# Patient Record
Sex: FEMALE | Race: WHITE | NOT HISPANIC OR LATINO | URBAN - METROPOLITAN AREA
[De-identification: names, ages, dates, MRNs, and addresses within clinical notes are randomized per-mention and may not be internally consistent; named-entity substitution may affect disease eponyms.]

---

## 2018-01-01 ENCOUNTER — INPATIENT (INPATIENT)
Facility: HOSPITAL | Age: 83
LOS: 0 days | DRG: 951 | End: 2018-10-29
Attending: INTERNAL MEDICINE | Admitting: INTERNAL MEDICINE
Payer: MEDICARE

## 2018-01-01 VITALS
OXYGEN SATURATION: 99 % | RESPIRATION RATE: 30 BRPM | DIASTOLIC BLOOD PRESSURE: 60 MMHG | SYSTOLIC BLOOD PRESSURE: 164 MMHG | HEART RATE: 72 BPM

## 2018-01-01 VITALS
DIASTOLIC BLOOD PRESSURE: 81 MMHG | HEART RATE: 67 BPM | OXYGEN SATURATION: 100 % | TEMPERATURE: 98 F | RESPIRATION RATE: 34 BRPM | WEIGHT: 104.94 LBS | SYSTOLIC BLOOD PRESSURE: 212 MMHG | HEIGHT: 60 IN

## 2018-01-01 DIAGNOSIS — I62.9 NONTRAUMATIC INTRACRANIAL HEMORRHAGE, UNSPECIFIED: ICD-10-CM

## 2018-01-01 DIAGNOSIS — Z29.9 ENCOUNTER FOR PROPHYLACTIC MEASURES, UNSPECIFIED: ICD-10-CM

## 2018-01-01 DIAGNOSIS — I21.4 NON-ST ELEVATION (NSTEMI) MYOCARDIAL INFARCTION: ICD-10-CM

## 2018-01-01 DIAGNOSIS — I10 ESSENTIAL (PRIMARY) HYPERTENSION: ICD-10-CM

## 2018-01-01 LAB
ALBUMIN SERPL ELPH-MCNC: 3.6 G/DL — SIGNIFICANT CHANGE UP (ref 3.3–5)
ALP SERPL-CCNC: 58 U/L — SIGNIFICANT CHANGE UP (ref 30–120)
ALT FLD-CCNC: 29 U/L DA — SIGNIFICANT CHANGE UP (ref 10–60)
ANION GAP SERPL CALC-SCNC: 12 MMOL/L — SIGNIFICANT CHANGE UP (ref 5–17)
APTT BLD: 27.8 SEC — SIGNIFICANT CHANGE UP (ref 27.5–37.4)
AST SERPL-CCNC: 43 U/L — HIGH (ref 10–40)
BASOPHILS # BLD AUTO: 0.05 K/UL — SIGNIFICANT CHANGE UP (ref 0–0.2)
BASOPHILS NFR BLD AUTO: 0.2 % — SIGNIFICANT CHANGE UP (ref 0–2)
BILIRUB SERPL-MCNC: 0.6 MG/DL — SIGNIFICANT CHANGE UP (ref 0.2–1.2)
BUN SERPL-MCNC: 23 MG/DL — SIGNIFICANT CHANGE UP (ref 7–23)
CALCIUM SERPL-MCNC: 9.4 MG/DL — SIGNIFICANT CHANGE UP (ref 8.4–10.5)
CHLORIDE SERPL-SCNC: 101 MMOL/L — SIGNIFICANT CHANGE UP (ref 96–108)
CK MB BLD-MCNC: 5.1 % — HIGH (ref 0–3.5)
CK MB CFR SERPL CALC: 12.2 NG/ML — HIGH (ref 0–3.6)
CK SERPL-CCNC: 238 U/L — HIGH (ref 26–192)
CO2 SERPL-SCNC: 28 MMOL/L — SIGNIFICANT CHANGE UP (ref 22–31)
CREAT SERPL-MCNC: 1.27 MG/DL — SIGNIFICANT CHANGE UP (ref 0.5–1.3)
EOSINOPHIL # BLD AUTO: 0 K/UL — SIGNIFICANT CHANGE UP (ref 0–0.5)
EOSINOPHIL NFR BLD AUTO: 0 % — SIGNIFICANT CHANGE UP (ref 0–6)
GLUCOSE SERPL-MCNC: 249 MG/DL — HIGH (ref 70–99)
HCT VFR BLD CALC: 42.7 % — SIGNIFICANT CHANGE UP (ref 34.5–45)
HGB BLD-MCNC: 14.6 G/DL — SIGNIFICANT CHANGE UP (ref 11.5–15.5)
IMM GRANULOCYTES NFR BLD AUTO: 0.9 % — SIGNIFICANT CHANGE UP (ref 0–1.5)
INR BLD: 1.04 RATIO — SIGNIFICANT CHANGE UP (ref 0.88–1.16)
LACTATE SERPL-SCNC: 3 MMOL/L — HIGH (ref 0.7–2)
LYMPHOCYTES # BLD AUTO: 0.67 K/UL — LOW (ref 1–3.3)
LYMPHOCYTES # BLD AUTO: 3 % — LOW (ref 13–44)
MCHC RBC-ENTMCNC: 29.7 PG — SIGNIFICANT CHANGE UP (ref 27–34)
MCHC RBC-ENTMCNC: 34.2 GM/DL — SIGNIFICANT CHANGE UP (ref 32–36)
MCV RBC AUTO: 86.8 FL — SIGNIFICANT CHANGE UP (ref 80–100)
MONOCYTES # BLD AUTO: 1.23 K/UL — HIGH (ref 0–0.9)
MONOCYTES NFR BLD AUTO: 5.5 % — SIGNIFICANT CHANGE UP (ref 2–14)
NEUTROPHILS # BLD AUTO: 20.11 K/UL — HIGH (ref 1.8–7.4)
NEUTROPHILS NFR BLD AUTO: 90.4 % — HIGH (ref 43–77)
PLATELET # BLD AUTO: 209 K/UL — SIGNIFICANT CHANGE UP (ref 150–400)
POTASSIUM SERPL-MCNC: 3.4 MMOL/L — LOW (ref 3.5–5.3)
POTASSIUM SERPL-SCNC: 3.4 MMOL/L — LOW (ref 3.5–5.3)
PROT SERPL-MCNC: 8.1 G/DL — SIGNIFICANT CHANGE UP (ref 6–8.3)
PROTHROM AB SERPL-ACNC: 11.3 SEC — SIGNIFICANT CHANGE UP (ref 9.8–12.7)
RBC # BLD: 4.92 M/UL — SIGNIFICANT CHANGE UP (ref 3.8–5.2)
RBC # FLD: 12.5 % — SIGNIFICANT CHANGE UP (ref 10.3–14.5)
SODIUM SERPL-SCNC: 141 MMOL/L — SIGNIFICANT CHANGE UP (ref 135–145)
TROPONIN I SERPL-MCNC: 5.16 NG/ML — HIGH (ref 0.02–0.06)
WBC # BLD: 22.27 K/UL — HIGH (ref 3.8–10.5)
WBC # FLD AUTO: 22.27 K/UL — HIGH (ref 3.8–10.5)

## 2018-01-01 PROCEDURE — 82550 ASSAY OF CK (CPK): CPT

## 2018-01-01 PROCEDURE — 93010 ELECTROCARDIOGRAM REPORT: CPT

## 2018-01-01 PROCEDURE — 99291 CRITICAL CARE FIRST HOUR: CPT | Mod: 25

## 2018-01-01 PROCEDURE — 70450 CT HEAD/BRAIN W/O DYE: CPT

## 2018-01-01 PROCEDURE — 93005 ELECTROCARDIOGRAM TRACING: CPT

## 2018-01-01 PROCEDURE — 36415 COLL VENOUS BLD VENIPUNCTURE: CPT

## 2018-01-01 PROCEDURE — 96374 THER/PROPH/DIAG INJ IV PUSH: CPT

## 2018-01-01 PROCEDURE — 71045 X-RAY EXAM CHEST 1 VIEW: CPT | Mod: 26

## 2018-01-01 PROCEDURE — 85610 PROTHROMBIN TIME: CPT

## 2018-01-01 PROCEDURE — 87040 BLOOD CULTURE FOR BACTERIA: CPT

## 2018-01-01 PROCEDURE — 70450 CT HEAD/BRAIN W/O DYE: CPT | Mod: 26

## 2018-01-01 PROCEDURE — 80053 COMPREHEN METABOLIC PANEL: CPT

## 2018-01-01 PROCEDURE — 83605 ASSAY OF LACTIC ACID: CPT

## 2018-01-01 PROCEDURE — 85027 COMPLETE CBC AUTOMATED: CPT

## 2018-01-01 PROCEDURE — 99223 1ST HOSP IP/OBS HIGH 75: CPT | Mod: AI

## 2018-01-01 PROCEDURE — 85730 THROMBOPLASTIN TIME PARTIAL: CPT

## 2018-01-01 PROCEDURE — 96375 TX/PRO/DX INJ NEW DRUG ADDON: CPT

## 2018-01-01 PROCEDURE — 71045 X-RAY EXAM CHEST 1 VIEW: CPT

## 2018-01-01 PROCEDURE — 72125 CT NECK SPINE W/O DYE: CPT

## 2018-01-01 PROCEDURE — 84484 ASSAY OF TROPONIN QUANT: CPT

## 2018-01-01 PROCEDURE — 72125 CT NECK SPINE W/O DYE: CPT | Mod: 26

## 2018-01-01 PROCEDURE — 99291 CRITICAL CARE FIRST HOUR: CPT

## 2018-01-01 PROCEDURE — 82553 CREATINE MB FRACTION: CPT

## 2018-01-01 RX ORDER — MORPHINE SULFATE 50 MG/1
2 CAPSULE, EXTENDED RELEASE ORAL
Qty: 0 | Refills: 0 | Status: DISCONTINUED | OUTPATIENT
Start: 2018-01-01 | End: 2018-01-01

## 2018-01-01 RX ORDER — SODIUM CHLORIDE 9 MG/ML
1000 INJECTION INTRAMUSCULAR; INTRAVENOUS; SUBCUTANEOUS ONCE
Qty: 0 | Refills: 0 | Status: COMPLETED | OUTPATIENT
Start: 2018-01-01 | End: 2018-01-01

## 2018-01-01 RX ORDER — HYDRALAZINE HCL 50 MG
10 TABLET ORAL ONCE
Qty: 0 | Refills: 0 | Status: COMPLETED | OUTPATIENT
Start: 2018-01-01 | End: 2018-01-01

## 2018-01-01 RX ORDER — MORPHINE SULFATE 50 MG/1
2 CAPSULE, EXTENDED RELEASE ORAL ONCE
Qty: 0 | Refills: 0 | Status: DISCONTINUED | OUTPATIENT
Start: 2018-01-01 | End: 2018-01-01

## 2018-01-01 RX ORDER — ATENOLOL 25 MG/1
0 TABLET ORAL
Qty: 0 | Refills: 0 | COMMUNITY

## 2018-01-01 RX ORDER — ATROPINE SULFATE 1 %
2 DROPS OPHTHALMIC (EYE)
Qty: 0 | Refills: 0 | Status: DISCONTINUED | OUTPATIENT
Start: 2018-01-01 | End: 2018-01-01

## 2018-01-01 RX ORDER — AMLODIPINE BESYLATE 2.5 MG/1
0 TABLET ORAL
Qty: 0 | Refills: 0 | COMMUNITY

## 2018-01-01 RX ORDER — ROSUVASTATIN CALCIUM 5 MG/1
1 TABLET ORAL
Qty: 0 | Refills: 0 | COMMUNITY

## 2018-01-01 RX ADMIN — Medication 10 MILLIGRAM(S): at 18:00

## 2018-01-01 RX ADMIN — MORPHINE SULFATE 2 MILLIGRAM(S): 50 CAPSULE, EXTENDED RELEASE ORAL at 19:10

## 2018-01-01 RX ADMIN — SODIUM CHLORIDE 1000 MILLILITER(S): 9 INJECTION INTRAMUSCULAR; INTRAVENOUS; SUBCUTANEOUS at 19:23

## 2018-01-01 RX ADMIN — MORPHINE SULFATE 2 MILLIGRAM(S): 50 CAPSULE, EXTENDED RELEASE ORAL at 19:20

## 2018-01-01 RX ADMIN — SODIUM CHLORIDE 1000 MILLILITER(S): 9 INJECTION INTRAMUSCULAR; INTRAVENOUS; SUBCUTANEOUS at 18:23

## 2018-10-28 NOTE — H&P ADULT - NSHPPHYSICALEXAM_GEN_ALL_CORE
-    Vital Signs Last 24 Hrs  T(C): 36.4 (28 Oct 2018 17:55), Max: 36.4 (28 Oct 2018 17:55)  T(F): 97.5 (28 Oct 2018 17:55), Max: 97.5 (28 Oct 2018 17:55)  HR: 72 (28 Oct 2018 18:49) (66 - 72)  BP: 164/60 (28 Oct 2018 18:49) (164/60 - 216/72)  BP(mean): --  RR: 30 (28 Oct 2018 18:49) (26 - 34)  SpO2: 99% (28 Oct 2018 18:49) (99% - 100%) -    Vital Signs Last 24 Hrs  T(C): 36.4 (28 Oct 2018 17:55), Max: 36.4 (28 Oct 2018 17:55)  T(F): 97.5 (28 Oct 2018 17:55), Max: 97.5 (28 Oct 2018 17:55)  HR: 72 (28 Oct 2018 18:49) (66 - 72)  BP: 164/60 (28 Oct 2018 18:49) (164/60 - 216/72)  BP(mean): --  RR: 30 (28 Oct 2018 18:49) (26 - 34)  SpO2: 99% (28 Oct 2018 18:49) (99% - 100%)          PHYSICAL EXAM:    GENERAL: NAD, well-groomed, well-developed.  HEAD:  Norm cephalic, small facial contusion.  EYES: Conjunctiva clear, B/L dilated fixed pupils.  ENMT: no nasal discharge, mouth breathing with mildly dry MM.   NECK: No JVD.  NERVOUS SYSTEM:  Deeply comatose, unresponsive.  CHEST/LUNG: Fair air entry B/L, with conducted upper airways sounds, no rales.  HEART: Normal S1 & S2, no murmurs, or extra sounds.  ABDOMEN: Soft, non-distended; bowel sounds present, no palpable masses or organomegaly.  EXTREMITIES:  No clubbing, cyanosis, or edema.  VASCULAR: 2+ radial, DPA / PTA pulses B/L.  SKIN: No rashes.  PSYCH: Unable to access, deeply comatose.

## 2018-10-28 NOTE — ED ADULT NURSE REASSESSMENT NOTE - NS ED NURSE REASSESS COMMENT FT1
family signed molst and wants pain meds thinks pt feeling pains meds given per md pt pending admission

## 2018-10-28 NOTE — H&P ADULT - HISTORY OF PRESENT ILLNESS
89 y/o F with PMH of HTN, and Dyslipidemia who presented with unresponsiveness. Patient daughter at the bedside states that her mom lives with her, but she was out for the whole day today, so last time they saw her was at dinner time last night, 87 y/o F with PMH of HTN, and Dyslipidemia who presented with unresponsiveness. Patient daughter at the bedside states that her mom lives with her, but she was out for the whole day today, so last time they saw her was at dinner time last night and she was ok without any complaint, she called her several times during the day with no answer or call back, thinking that she didn't hear the phone because of her hearing impairment, she waited, but when she couldn't reach her till 4:30 pm, she came back to house to find her on the floor unresponsive. At the ED she was found to have ICH with herniation. Family requested admitting the patient for comfort measures only. This is an 87 y/o F with PMH of HTN, and Dyslipidemia who presented with unresponsiveness. Patient daughter at the bedside states that her mom lives with her, but she was out for the whole day today, so last time they saw her was at dinner time last night and she was ok without any complaint, she called her several times during the day with no answer or call back, thinking that she didn't hear the phone because of her hearing impairment, she waited, but when she couldn't reach her till 4:30 pm, she came back to house to find her on the floor unresponsive. At the ED she was found to have ICH with herniation. Family requested admitting the patient for comfort measures only.

## 2018-10-28 NOTE — CONSULT NOTE ADULT - SUBJECTIVE AND OBJECTIVE BOX
Date/Time Patient Seen:  		  Referring MD:   Data Reviewed	       Patient is a 88y old  Female who presents with a chief complaint of     Subjective/HPI    in bed  seen and examined, vs and meds reviewed, ct scan reviewed with family at the bedside  ICH  asking for palliative measures  prognosis very poor, family aware    ED Provider Note [Charted Location: [facility]] [Authored: 28-Oct-2018 18:03]- for Visit: [id number], Complete, Revised, Signed in Full, General    HISTORY OF PRESENT ILLNESS:    High Risk Travel:  International Travel? No(1)    · Chief Complaint: The patient is a 88y Female complaining of AMS  · Unable to Obtain: Unresponsive  · HPI Objective Statement: 89 yo F p/w was found by daughter today unresponsive and on the floor. NO obv signs of trauma. Last well known yest. No recent illness. no other hx avail.    PAST MEDICAL/SURGICAL/FAMILY/SOCIAL HISTORY:    Tobacco Usage:  · Tobacco Usage	Unknown if ever smoked    ALLERGIES AND HOME MEDICATIONS:   Allergies:        Allergies:  	codeine: Drug, Unknown       PAST MEDICAL & SURGICAL HISTORY:        Medication list         MEDICATIONS  (STANDING):    MEDICATIONS  (PRN):  artificial  tears Solution 1 Drop(s) Both EYES three times a day PRN Dry Eyes  atropine 1% Ophthalmic Solution for SubLingual Use 2 Drop(s) SubLingual four times a day PRN oral secretions  bisacodyl Suppository 10 milliGRAM(s) Rectal daily PRN Constipation  morphine  - Injectable 2 milliGRAM(s) IV Push every 30 minutes PRN dyspnea, distress, pain, end of life care         Vitals log        ICU Vital Signs Last 24 Hrs  T(C): 36.4 (28 Oct 2018 17:55), Max: 36.4 (28 Oct 2018 17:55)  T(F): 97.5 (28 Oct 2018 17:55), Max: 97.5 (28 Oct 2018 17:55)  HR: 72 (28 Oct 2018 18:49) (66 - 72)  BP: 164/60 (28 Oct 2018 18:49) (164/60 - 216/72)  BP(mean): --  ABP: --  ABP(mean): --  RR: 30 (28 Oct 2018 18:49) (26 - 34)  SpO2: 99% (28 Oct 2018 18:49) (99% - 100%)           Input and Output:  I&O's Detail      Lab Data                        14.6   22.27 )-----------( 209      ( 28 Oct 2018 18:06 )             42.7     10-28    141  |  101  |  23  ----------------------------<  249<H>  3.4<L>   |  28  |  1.27    Ca    9.4      28 Oct 2018 18:06    TPro  8.1  /  Alb  3.6  /  TBili  0.6  /  DBili  x   /  AST  43<H>  /  ALT  29  /  AlkPhos  58  10-28      CARDIAC MARKERS ( 28 Oct 2018 18:06 )  5.164 ng/mL / x     / 238 U/L / x     / 12.2 ng/mL        Review of Systems	      Objective     Physical Examination    frail  heart s1s2  lung dec BS      Pertinent Lab findings & Imaging      Luis:  NO   Adequate UO     I&O's Detail           Discussed with:     Cultures:	        Radiology

## 2018-10-28 NOTE — ED ADULT NURSE NOTE - OBJECTIVE STATEMENT
unresponsive at home found on floor unknown for how long last seen normal this morning pupils unequal and not reactive incontinent large brown stool and urine pt unresponsive to pains hard c collar applied on arrival

## 2018-10-28 NOTE — ED PEDIATRIC NURSE REASSESSMENT NOTE - NS ED NURSE REASSESS COMMENT FT2
returned from ct scan unresponsive right pupil larger than left both unreactive lungs clear and equal b/l

## 2018-10-28 NOTE — ED PROVIDER NOTE - PROGRESS NOTE DETAILS
Dw Pts daughter at length - pt is DNR butr she will make decision on interventions depending on CT At length dw pts family, including [place], pts daughter who is a HCP. Dw her and other family at bedside at length re nature of inj / findings. After many questions answered, she states they does not want neurosurgical intervention, no acute intervention. They state pt is DNR / DNI and they understand that this is a fatal diagnosis. They do not want any furthre intervention except palliative interventions.   Soto Colon, will see pt. Soto Melgar, Accepts admit to Dr Carl Dr Colon at bedside with family, no acute changes

## 2018-10-28 NOTE — CONSULT NOTE ADULT - PROBLEM SELECTOR RECOMMENDATION 9
ICH  end of life care  oral and skin care  palliative measures, morphine PRN, Dulcolax PRN, artificial tears PRN and Atropine PRN  will follow  prognosis very poor  pt is DNR DNI  will change o2 to NC   no RRT  no MEWS  discussed prognosis and plan of care with family and ER MD and Nursing

## 2018-10-28 NOTE — H&P ADULT - PROBLEM SELECTOR PLAN 4
IMPROVE VTE Individual Risk Assessment          RISK                                                          Points    [  ] Previous VTE                                                3  [  ] Thrombophilia                                             2  [  ] Lower limb paralysis                                    2        (unable to hold up >15 seconds)    [  ] Current Cancer                                             2         (within 6 months)  [ x ] Immobilization > 24 hrs                              1  [  ] ICU/CCU stay > 24 hours                            1  [ x ] Age > 60                                                    1    IMPROVE VTE Score 2.      **IMPROVE score of 2, admitted with ICH for end of life care.

## 2018-10-28 NOTE — H&P ADULT - ATTENDING COMMENTS
Management plan was discussed with patient's family members at the bedside, all questions answered, they are aware of the prognosis, they all understand & agree.

## 2018-10-28 NOTE — ED ADULT NURSE NOTE - NSIMPLEMENTINTERV_GEN_ALL_ED
Implemented All Fall Risk Interventions:  [place] to call system. Call bell, personal items and telephone within reach. Instruct patient to call for assistance. Room bathroom lighting operational. Non-slip footwear when patient is off stretcher. Physically safe environment: no spills, clutter or unnecessary equipment. Stretcher in lowest position, wheels locked, appropriate side rails in place. Provide visual cue, wrist band, yellow gown, etc. Monitor gait and stability. Monitor for mental status changes and reorient to person, place, and time. Review medications for side effects contributing to fall risk. Reinforce activity limits and safety measures with patient and family.

## 2018-10-28 NOTE — ED PROVIDER NOTE - CRITICAL CARE PROVIDED
interpretation of diagnostic studies/documentation/additional history taking/consultation with other physicians/direct patient care (not related to procedure)/consult w/ pt's family directly relating to pts condition/conducted a detailed discussion of DNR status

## 2018-10-28 NOTE — ED PROVIDER NOTE - OBJECTIVE STATEMENT
87 yo F p/w was found by daughter today unresponsive and on the floor. NO obv signs of trauma. Last well known yest. No recent illness. no other hx avail.

## 2018-10-28 NOTE — H&P ADULT - NSHPLABSRESULTS_GEN_ALL_CORE
-        Lactate Trend  10-28 @ 18:06 Lactate:3.0                           14.6   22.27 )-----------( 209      ( 28 Oct 2018 18:06 )             42.7            10-28    141  |  101  |  23  ----------------------------<  249<H>  3.4<L>   |  28  |  1.27    Ca    9.4      28 Oct 2018 18:06    TPro  8.1  /  Alb  3.6  /  TBili  0.6  /  DBili  x   /  AST  43<H>  /  ALT  29  /  AlkPhos  58  10-28            PT/INR - ( 28 Oct 2018 18:06 )   PT: 11.3 sec;   INR: 1.04 ratio         PTT - ( 28 Oct 2018 18:06 )  PTT:27.8 sec  CARDIAC MARKERS ( 28 Oct 2018 18:06 )  5.164 ng/mL / x     / 238 U/L / x     / 12.2 ng/mL -        Lactate Trend  10-28 @ 18:06 Lactate:3.0                           14.6   22.27 )-----------( 209      ( 28 Oct 2018 18:06 )             42.7            10-28    141  |  101  |  23  ----------------------------<  249<H>  3.4<L>   |  28  |  1.27    Ca    9.4      28 Oct 2018 18:06    TPro  8.1  /  Alb  3.6  /  TBili  0.6  /  DBili  x   /  AST  43<H>  /  ALT  29  /  AlkPhos  58  10-28            PT/INR - ( 28 Oct 2018 18:06 )   PT: 11.3 sec;   INR: 1.04 ratio         PTT - ( 28 Oct 2018 18:06 )  PTT:27.8 sec  CARDIAC MARKERS ( 28 Oct 2018 18:06 )  5.164 ng/mL / x     / 238 U/L / x     / 12.2 ng/mL              CT BRAIN :    There is a large acute parenchymal hematoma along the right   frontoparietal lobe measuring at least 7.5 x 4.9 x 5.5 cm with   surrounding edema. Smaller adjacent intraparenchymal hemorrhages also   noted. There is breakthrough subdural hemorrhage along the right cerebral   convexity measuring up to 9 mm along the frontal convexity. There is 1.9   cm midline shift to the left with entrapment of the left lateral   ventricle. There is intraventricular extension of hemorrhage. There is   poor visualization of the cerebral sulci as well as the basal cisterns   due to uncal herniation. Scattered subarachnoid hemorrhage also noted.    The calvarium is intact. The visualized paranasal sinuses are aerated.   The mastoid air cells are clear.    CT CERVICAL SPINE:    Straightening of cervical curvature is likely positional. No acute   cervical spine fracture is seen.  Prevertebral soft tissues are within normal.  Multilevel degenerative changes noted. There is disc space narrowing and   disc osteophyte complexes throughout the cervical spine. 3 mm   anterolisthesis noted at C7 on T1.  The lung apices are clear. Debris noted along the trachea.    IMPRESSION:    CT BRAIN: Large acute intraparenchymal hematoma along the right   frontoparietal lobe with associated edema. There is intraventricular   extension of blood, as well as right cerebral convexity subdural blood   and scattered subarachnoid hemorrhage. There is 1.9 cm midline shift to   the left with poor visualization of the basal cisterns due to herniation.   Potential etiologies include hemorrhagic infarct, amyloid, or underlying   lesion.    CERVICAL SPINE CT: No acute cervical spine fracture. Degenerative changes   noted. -        Lactate Trend  10-28 @ 18:06 Lactate:3.0                           14.6   22.27 )-----------( 209      ( 28 Oct 2018 18:06 )             42.7            10-28    141  |  101  |  23  ----------------------------<  249<H>  3.4<L>   |  28  |  1.27    Ca    9.4      28 Oct 2018 18:06    TPro  8.1  /  Alb  3.6  /  TBili  0.6  /  DBili  x   /  AST  43<H>  /  ALT  29  /  AlkPhos  58  10-28            PT/INR - ( 28 Oct 2018 18:06 )   PT: 11.3 sec;   INR: 1.04 ratio         PTT - ( 28 Oct 2018 18:06 )  PTT:27.8 sec  CARDIAC MARKERS ( 28 Oct 2018 18:06 )  5.164 ng/mL / x     / 238 U/L / x     / 12.2 ng/mL              CT BRAIN :    There is a large acute parenchymal hematoma along the right   frontoparietal lobe measuring at least 7.5 x 4.9 x 5.5 cm with   surrounding edema. Smaller adjacent intraparenchymal hemorrhages also   noted. There is breakthrough subdural hemorrhage along the right cerebral   convexity measuring up to 9 mm along the frontal convexity. There is 1.9   cm midline shift to the left with entrapment of the left lateral   ventricle. There is intraventricular extension of hemorrhage. There is   poor visualization of the cerebral sulci as well as the basal cisterns   due to uncal herniation. Scattered subarachnoid hemorrhage also noted.    The calvarium is intact. The visualized paranasal sinuses are aerated.   The mastoid air cells are clear.    CT CERVICAL SPINE:    Straightening of cervical curvature is likely positional. No acute   cervical spine fracture is seen.  Prevertebral soft tissues are within normal.  Multilevel degenerative changes noted. There is disc space narrowing and   disc osteophyte complexes throughout the cervical spine. 3 mm   anterolisthesis noted at C7 on T1.  The lung apices are clear. Debris noted along the trachea.    IMPRESSION:    CT BRAIN: Large acute intraparenchymal hematoma along the right   frontoparietal lobe with associated edema. There is intraventricular   extension of blood, as well as right cerebral convexity subdural blood   and scattered subarachnoid hemorrhage. There is 1.9 cm midline shift to   the left with poor visualization of the basal cisterns due to herniation.   Potential etiologies include hemorrhagic infarct, amyloid, or underlying   lesion.    CERVICAL SPINE CT: No acute cervical spine fracture. Degenerative changes   noted.          CXR:    As per my review shows normal cardiac shadow size, clear lung fields B/L, no pulmonary infiltrates, pleural effusion, or pneumothorax. Pending official report.          EKG:    As per my review shows SR at 65/min, LAD (- 45), biatrial enlargement, incomplete RBBB, CLARITZA, LVH, marked ST abnormality particularly in inferior leads.        -

## 2018-10-28 NOTE — H&P ADULT - PROBLEM SELECTOR PLAN 1
Family members at bedside, aware of poor prognosis, requested comfortable measures only, palliative care consult with Dr. Colon was called, already saw patient at the ED and discussed with her family, admitted to medicine for end of life care.

## 2018-10-29 NOTE — CHART NOTE - NSCHARTNOTEFT_GEN_A_CORE
Called by RN as patient , seen at the bedside, absent carotid pulse B/L, no spontaneous breathing or heart beat, pupils are dilated & fixed B/L, patient was pronounced dead today 2018 at 1:07 am, Family members at the bedside.

## 2018-11-02 LAB
CULTURE RESULTS: SIGNIFICANT CHANGE UP
CULTURE RESULTS: SIGNIFICANT CHANGE UP
SPECIMEN SOURCE: SIGNIFICANT CHANGE UP
SPECIMEN SOURCE: SIGNIFICANT CHANGE UP

## 2019-03-21 NOTE — ED PROVIDER NOTE - INTERPRETATION
[FreeTextEntry1] : 1) Breast cancer \par - off of meds \par - mammo / US ordered \par \par 2) weight \par - stable\par - ct trying to consume to high protein and calorie diet \par \par 3) Adrenal Nodule \par - get MRI  to better define the nodule\par - endo eval \par - hormonal studies negative \par \par 4) OP \par - after dental work is complete , will start Prolia\par - pt falls bc seizures \par \par 5) ch pain 
normal sinus rhythm

## 2019-11-06 NOTE — ED ADULT NURSE NOTE - BREATHING, MLM
[General Appearance - Alert] : alert [General Appearance - In No Acute Distress] : in no acute distress [Full Pulse] : the pedal pulses are present [Edema] : there was no peripheral edema [Skin Color & Pigmentation] : normal skin color and pigmentation [Skin Turgor] : normal skin turgor [] : no rash [Deep Tendon Reflexes (DTR)] : deep tendon reflexes were 2+ and symmetric [Sensation] : the sensory exam was normal to light touch and pinprick [No Focal Deficits] : no focal deficits Spontaneous, unlabored and symmetrical [Oriented To Time, Place, And Person] : oriented to person, place, and time [Impaired Insight] : insight and judgment were intact [Affect] : the affect was normal [FreeTextEntry1] : The patient does not have the ability to actively extend lesser toes 2 and 3 and upon physical examination there does not appear to be strength to the extensor digitorum longus tendons to the second and her lesser degree the third toe of the left foot only otherwise and normal musculoskeletal evaluation of the lower extremity
